# Patient Record
Sex: MALE | Race: OTHER | HISPANIC OR LATINO | URBAN - METROPOLITAN AREA
[De-identification: names, ages, dates, MRNs, and addresses within clinical notes are randomized per-mention and may not be internally consistent; named-entity substitution may affect disease eponyms.]

---

## 2020-12-21 ENCOUNTER — EMERGENCY (EMERGENCY)
Facility: HOSPITAL | Age: 25
LOS: 1 days | Discharge: HOME | End: 2020-12-21
Attending: STUDENT IN AN ORGANIZED HEALTH CARE EDUCATION/TRAINING PROGRAM | Admitting: STUDENT IN AN ORGANIZED HEALTH CARE EDUCATION/TRAINING PROGRAM
Payer: OTHER MISCELLANEOUS

## 2020-12-21 VITALS
SYSTOLIC BLOOD PRESSURE: 139 MMHG | OXYGEN SATURATION: 100 % | RESPIRATION RATE: 18 BRPM | HEART RATE: 81 BPM | DIASTOLIC BLOOD PRESSURE: 80 MMHG | TEMPERATURE: 99 F

## 2020-12-21 VITALS
RESPIRATION RATE: 20 BRPM | OXYGEN SATURATION: 100 % | TEMPERATURE: 99 F | SYSTOLIC BLOOD PRESSURE: 141 MMHG | DIASTOLIC BLOOD PRESSURE: 89 MMHG | HEART RATE: 82 BPM

## 2020-12-21 LAB
ALBUMIN SERPL ELPH-MCNC: 4.6 G/DL — SIGNIFICANT CHANGE UP (ref 3.3–5)
ALP SERPL-CCNC: 75 U/L — SIGNIFICANT CHANGE UP (ref 40–120)
ALT FLD-CCNC: 111 U/L — HIGH (ref 4–41)
ANION GAP SERPL CALC-SCNC: 9 MMOL/L — SIGNIFICANT CHANGE UP (ref 7–14)
APTT BLD: 30.6 SEC — SIGNIFICANT CHANGE UP (ref 27–36.3)
AST SERPL-CCNC: 40 U/L — SIGNIFICANT CHANGE UP (ref 4–40)
BASOPHILS # BLD AUTO: 0.03 K/UL — SIGNIFICANT CHANGE UP (ref 0–0.2)
BASOPHILS NFR BLD AUTO: 0.4 % — SIGNIFICANT CHANGE UP (ref 0–2)
BILIRUB SERPL-MCNC: 0.4 MG/DL — SIGNIFICANT CHANGE UP (ref 0.2–1.2)
BUN SERPL-MCNC: 14 MG/DL — SIGNIFICANT CHANGE UP (ref 7–23)
CALCIUM SERPL-MCNC: 10.3 MG/DL — SIGNIFICANT CHANGE UP (ref 8.4–10.5)
CHLORIDE SERPL-SCNC: 99 MMOL/L — SIGNIFICANT CHANGE UP (ref 98–107)
CO2 SERPL-SCNC: 31 MMOL/L — SIGNIFICANT CHANGE UP (ref 22–31)
CREAT SERPL-MCNC: 0.86 MG/DL — SIGNIFICANT CHANGE UP (ref 0.5–1.3)
EOSINOPHIL # BLD AUTO: 0.12 K/UL — SIGNIFICANT CHANGE UP (ref 0–0.5)
EOSINOPHIL NFR BLD AUTO: 1.7 % — SIGNIFICANT CHANGE UP (ref 0–6)
GLUCOSE SERPL-MCNC: 92 MG/DL — SIGNIFICANT CHANGE UP (ref 70–99)
HCT VFR BLD CALC: 47.4 % — SIGNIFICANT CHANGE UP (ref 39–50)
HGB BLD-MCNC: 15.3 G/DL — SIGNIFICANT CHANGE UP (ref 13–17)
IANC: 4.78 K/UL — SIGNIFICANT CHANGE UP (ref 1.5–8.5)
IMM GRANULOCYTES NFR BLD AUTO: 0.3 % — SIGNIFICANT CHANGE UP (ref 0–1.5)
INR BLD: 1.15 RATIO — SIGNIFICANT CHANGE UP (ref 0.88–1.17)
LYMPHOCYTES # BLD AUTO: 1.61 K/UL — SIGNIFICANT CHANGE UP (ref 1–3.3)
LYMPHOCYTES # BLD AUTO: 22.5 % — SIGNIFICANT CHANGE UP (ref 13–44)
MAGNESIUM SERPL-MCNC: 1.9 MG/DL — SIGNIFICANT CHANGE UP (ref 1.6–2.6)
MCHC RBC-ENTMCNC: 29.1 PG — SIGNIFICANT CHANGE UP (ref 27–34)
MCHC RBC-ENTMCNC: 32.3 GM/DL — SIGNIFICANT CHANGE UP (ref 32–36)
MCV RBC AUTO: 90.1 FL — SIGNIFICANT CHANGE UP (ref 80–100)
MONOCYTES # BLD AUTO: 0.61 K/UL — SIGNIFICANT CHANGE UP (ref 0–0.9)
MONOCYTES NFR BLD AUTO: 8.5 % — SIGNIFICANT CHANGE UP (ref 2–14)
NEUTROPHILS # BLD AUTO: 4.78 K/UL — SIGNIFICANT CHANGE UP (ref 1.8–7.4)
NEUTROPHILS NFR BLD AUTO: 66.6 % — SIGNIFICANT CHANGE UP (ref 43–77)
NRBC # BLD: 0 /100 WBCS — SIGNIFICANT CHANGE UP
NRBC # FLD: 0 K/UL — SIGNIFICANT CHANGE UP
PLATELET # BLD AUTO: 277 K/UL — SIGNIFICANT CHANGE UP (ref 150–400)
POTASSIUM SERPL-MCNC: 3.7 MMOL/L — SIGNIFICANT CHANGE UP (ref 3.5–5.3)
POTASSIUM SERPL-SCNC: 3.7 MMOL/L — SIGNIFICANT CHANGE UP (ref 3.5–5.3)
PROT SERPL-MCNC: 7.4 G/DL — SIGNIFICANT CHANGE UP (ref 6–8.3)
PROTHROM AB SERPL-ACNC: 13.1 SEC — SIGNIFICANT CHANGE UP (ref 9.8–13.1)
RBC # BLD: 5.26 M/UL — SIGNIFICANT CHANGE UP (ref 4.2–5.8)
RBC # FLD: 12 % — SIGNIFICANT CHANGE UP (ref 10.3–14.5)
SARS-COV-2 RNA SPEC QL NAA+PROBE: SIGNIFICANT CHANGE UP
SODIUM SERPL-SCNC: 139 MMOL/L — SIGNIFICANT CHANGE UP (ref 135–145)
WBC # BLD: 7.17 K/UL — SIGNIFICANT CHANGE UP (ref 3.8–10.5)
WBC # FLD AUTO: 7.17 K/UL — SIGNIFICANT CHANGE UP (ref 3.8–10.5)

## 2020-12-21 PROCEDURE — 99283 EMERGENCY DEPT VISIT LOW MDM: CPT

## 2020-12-21 RX ORDER — FONDAPARINUX SODIUM 2.5 MG/.5ML
1 INJECTION, SOLUTION SUBCUTANEOUS
Qty: 42 | Refills: 0
Start: 2020-12-21 | End: 2021-01-10

## 2020-12-21 RX ORDER — RIVAROXABAN 15 MG-20MG
15 KIT ORAL ONCE
Refills: 0 | Status: COMPLETED | OUTPATIENT
Start: 2020-12-21 | End: 2020-12-21

## 2020-12-21 RX ADMIN — RIVAROXABAN 15 MILLIGRAM(S): KIT at 19:33

## 2020-12-21 NOTE — ED PROVIDER NOTE - OBJECTIVE STATEMENT
25M s/p ACL surgery 2 weeks ago and c/o confirmed DVT in the right leg diagnosed on ultrasound today. no pcp.  no cp or sob

## 2020-12-21 NOTE — ED ADULT NURSE NOTE - NSIMPLEMENTINTERV_GEN_ALL_ED
Implemented All Universal Safety Interventions:  [place] to call system. Call bell, personal items and telephone within reach. Instruct patient to call for assistance. Room bathroom lighting operational. Non-slip footwear when patient is off stretcher. Physically safe environment: no spills, clutter or unnecessary equipment. Stretcher in lowest position, wheels locked, appropriate side rails in place.

## 2020-12-21 NOTE — ED PROVIDER NOTE - ATTENDING CONTRIBUTION TO CARE
26 y/o M with no PMH p/w Right calf swelling. pt recently had ACL surgery 2 weeks ago with dr. salvador sheikh. he was having PT and the noticed swelling in his right lower foot. he had an US showing acute venous thrombosis of the distal posterior tibital vein. Pt denies chest pain, SOB nausea vomiting, diarrhea. he states he has mild pain in the right calf with swelling.   he denies having any allergies or PCP.   denies fever, chills, chest pain, SOB, abdominal pain, diarrhea, dysuria, syncope, bleeding, new rash, weakness, numbness, blurred vision  right leg swelling  ROS  otherwise negative as per HPI  Gen: Awake, Alert, WD, WN, NAD  Head:  NC/AT  Eyes:  PERRL, EOMI, Conjunctiva pink, lids normal, no scleral icterus  ENT: OP clear, no exudates, no erythema, uvula midline, TMs clear bilaterally, moist mucus membranes  Neck: supple, nontender, no meningismus, no JVD, trachea midline  Cardiac/CV:  S1 S2, RRR, no M/G/R  Respiratory/Pulm:  CTAB, good air movement, normal resp effort, no wheezes/stridor/retractions/rales/rhonchi  Gastrointestinal/Abdomen:  Soft, nontender, nondistended, +BS, no rebound/guarding  Back:  no CVAT, no MLT  Ext:  warm, well perfused, moving all extremities spontaneously, no peripheral edema, distal pulses intact. swelling of right lower extremity   Skin: intact, no rash, incision sites c/d/i   Neuro:  AAOx3, sensation intact, motor 5/5 x 4 extremities, normal gait, speech clear  MDM as above

## 2020-12-21 NOTE — ED PROVIDER NOTE - PHYSICAL EXAMINATION
GEN: Well appearing, well nourished, in no apparent distress.  HEAD: NCAT  HEENT: PERRL, Airway patent, EOMI, non-erythematous pharynx, no exudates, uvula midline, MMM, neck supple, no LAD, no JVD  LUNG: CTAB, no adventitious sounds, no retractions, no nasal flaring  CV: RRR, no murmurs,   Abd: soft, NTND, no rebound or guarding, BS+ in all quadrants, no CVAT  MSK: WWP, Pulses 2+ in extremities, No edema +mild r knee redness +ttp posterior leg   Neuro:  AAOx3, Ambulatory with stable gait. SCHWARTZ without laterality  Skin: Warm and dry, no evidence of rash  Psych: normal mood and affect

## 2020-12-21 NOTE — ED PROVIDER NOTE - NSFOLLOWUPCLINICS_GEN_ALL_ED_FT
[facility] General Internal Medicine  General Internal Medicine  [address] [id number]  Phone: [phone]  Fax:   Follow Up Time:

## 2020-12-21 NOTE — ED ADULT NURSE NOTE - OBJECTIVE STATEMENT
pt alert,oriented x3. redness r leg with swelling. sent to ed with positive dvt for evaluaution. iv access,labs sent. will continue to monitor

## 2020-12-21 NOTE — ED PROVIDER NOTE - NS ED ROS FT
Constitutional: no fevers, no chills.  Eyes: no visual changes.  Ears: no ear drainage, no ear pain.  Nose: no nasal congestion.  Mouth/Throat: no sore throat.  Cardiovascular: no chest pain.  Respiratory: no shortness of breath, no wheezing, no cough  Gastrointestinal: no nausea, no vomiting, no diarrhea, no abdominal pain.  MSK: no flank pain, no back pain. +R leg pain  Genitourinary: no dysuria, no hematuria.  Skin: no rashes.  Neuro: no headache

## 2020-12-21 NOTE — ED PROVIDER NOTE - NSFOLLOWUPINSTRUCTIONS_ED_ALL_ED_FT
1) Please Follow up with PMD/Clinic within 2-3 days!!! Call the number above to make appointment.     2) Please start taking Xarelto starting tomorrow morning for next 21 days.     3) Please take Tylenol 650mg every 4-6 hours as needed for pain.    4) Please return to  Emergency Department for any new or worsening symptoms.

## 2020-12-21 NOTE — ED PROVIDER NOTE - PATIENT PORTAL LINK FT
You can access the FollowMyHealth Patient Portal offered by [facility] by registering at the following website: [email or web address]. By joining [facility]’s FollowMyHealth portal, you will also be able to view your health information using other applications (apps) compatible with our system.

## 2020-12-21 NOTE — ED ADULT TRIAGE NOTE - CHIEF COMPLAINT QUOTE
Pt s/p ACL surgery 2 weeks ago and c/o confirmed DVT in the right leg diagnosed on ultrasound today. Pt denies SOB

## 2020-12-24 PROBLEM — Z00.00 ENCOUNTER FOR PREVENTIVE HEALTH EXAMINATION: Status: ACTIVE | Noted: 2020-12-24

## 2021-01-08 ENCOUNTER — OUTPATIENT (OUTPATIENT)
Dept: OUTPATIENT SERVICES | Facility: HOSPITAL | Age: 26
LOS: 1 days | End: 2021-01-08
Payer: SELF-PAY

## 2021-01-08 ENCOUNTER — APPOINTMENT (OUTPATIENT)
Dept: INTERNAL MEDICINE | Facility: CLINIC | Age: 26
End: 2021-01-08

## 2021-01-08 DIAGNOSIS — I10 ESSENTIAL (PRIMARY) HYPERTENSION: ICD-10-CM

## 2021-01-08 DIAGNOSIS — Z78.9 OTHER SPECIFIED HEALTH STATUS: ICD-10-CM

## 2021-01-08 PROCEDURE — G0463: CPT

## 2021-01-08 RX ORDER — RIVAROXABAN 15 MG/1
15 TABLET, FILM COATED ORAL DAILY
Refills: 0 | Status: ACTIVE | COMMUNITY
Start: 2021-01-08

## 2021-01-08 RX ORDER — RIVAROXABAN 20 MG/1
20 TABLET, FILM COATED ORAL DAILY
Qty: 90 | Refills: 0 | Status: ACTIVE | COMMUNITY
Start: 2021-01-08 | End: 1900-01-01

## 2021-01-08 NOTE — PHYSICAL EXAM
[No Acute Distress] : no acute distress [Well Nourished] : well nourished [No Respiratory Distress] : no respiratory distress  [No Accessory Muscle Use] : no accessory muscle use [de-identified] : appears comfortable [de-identified] : N [de-identified] : R knee with mild swelling, no erythema present. R and L calf region equal in girth. No discoloration
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
## 2021-01-08 NOTE — ASSESSMENT
[FreeTextEntry1] : \par Patient is a 25 year old otherwise healthy male with recent history of provoked R DVT after R knee surgery on xarelto\par \par #Provoked DVT on US 12/21\par -  xarelto 15mg BID was prescribed in ER x 21 days\par - will start xarelto 20mg q D for at least 3 months afterwards\par - advised on redflag symps for PE\par - will f/u in 1 month\par \par d/w Dr. Jay \par

## 2021-01-15 DIAGNOSIS — I82.409 ACUTE EMBOLISM AND THROMBOSIS OF UNSPECIFIED DEEP VEINS OF UNSPECIFIED LOWER EXTREMITY: ICD-10-CM

## 2021-01-15 DIAGNOSIS — Z78.9 OTHER SPECIFIED HEALTH STATUS: ICD-10-CM

## 2021-01-28 ENCOUNTER — APPOINTMENT (OUTPATIENT)
Dept: INTERNAL MEDICINE | Facility: CLINIC | Age: 26
End: 2021-01-28

## 2021-01-28 ENCOUNTER — NON-APPOINTMENT (OUTPATIENT)
Age: 26
End: 2021-01-28

## 2021-01-28 VITALS
HEART RATE: 68 BPM | WEIGHT: 192 LBS | OXYGEN SATURATION: 98 % | SYSTOLIC BLOOD PRESSURE: 120 MMHG | BODY MASS INDEX: 29.1 KG/M2 | HEIGHT: 68 IN | DIASTOLIC BLOOD PRESSURE: 70 MMHG

## 2021-01-28 DIAGNOSIS — I82.409 ACUTE EMBOLISM AND THROMBOSIS OF UNSPECIFIED DEEP VEINS OF UNSPECIFIED LOWER EXTREMITY: ICD-10-CM

## 2021-01-31 NOTE — ASSESSMENT
[FreeTextEntry1] : 26 y/o gentleman w/PMH of R posterior tibial vein DVT (provoked), on Xarelto for AC, presents for letter of clearance to begin PT\par \par #DVT\par -letter to be written today, pt is clear from medical perspective to begin PT as he is tolerating AC treatment w/no new bleeding/bruising.\par -will see pt in April, when he finishes 3 month course of AC for provoked DVT

## 2021-01-31 NOTE — HISTORY OF PRESENT ILLNESS
[FreeTextEntry8] : Pt here today requesting a letter stating he is medically cleared to start PT. Has been taking Xarelto 20mg since 1/12/21 w/no issues.

## 2021-05-11 ENCOUNTER — APPOINTMENT (OUTPATIENT)
Dept: INTERNAL MEDICINE | Facility: CLINIC | Age: 26
End: 2021-05-11

## 2022-12-01 ENCOUNTER — EMERGENCY (EMERGENCY)
Facility: HOSPITAL | Age: 27
LOS: 1 days | Discharge: HOME | End: 2022-12-01
Attending: EMERGENCY MEDICINE | Admitting: EMERGENCY MEDICINE

## 2022-12-01 VITALS
TEMPERATURE: 101 F | SYSTOLIC BLOOD PRESSURE: 152 MMHG | RESPIRATION RATE: 16 BRPM | DIASTOLIC BLOOD PRESSURE: 72 MMHG | OXYGEN SATURATION: 98 % | HEART RATE: 100 BPM

## 2022-12-01 VITALS
HEART RATE: 118 BPM | SYSTOLIC BLOOD PRESSURE: 139 MMHG | OXYGEN SATURATION: 99 % | RESPIRATION RATE: 16 BRPM | DIASTOLIC BLOOD PRESSURE: 77 MMHG | TEMPERATURE: 103 F

## 2022-12-01 LAB
FLUAV AG NPH QL: DETECTED
FLUBV AG NPH QL: SIGNIFICANT CHANGE UP
RSV RNA NPH QL NAA+NON-PROBE: SIGNIFICANT CHANGE UP
SARS-COV-2 RNA SPEC QL NAA+PROBE: SIGNIFICANT CHANGE UP

## 2022-12-01 PROCEDURE — 99284 EMERGENCY DEPT VISIT MOD MDM: CPT

## 2022-12-01 RX ORDER — ACETAMINOPHEN 500 MG
975 TABLET ORAL ONCE
Refills: 0 | Status: COMPLETED | OUTPATIENT
Start: 2022-12-01 | End: 2022-12-01

## 2022-12-01 RX ORDER — IBUPROFEN 200 MG
600 TABLET ORAL ONCE
Refills: 0 | Status: COMPLETED | OUTPATIENT
Start: 2022-12-01 | End: 2022-12-01

## 2022-12-01 RX ADMIN — Medication 975 MILLIGRAM(S): at 11:18

## 2022-12-01 RX ADMIN — Medication 600 MILLIGRAM(S): at 11:18

## 2022-12-01 NOTE — ED PROVIDER NOTE - NS ED MD DISPO DISCHARGE CCDA
Past History


Past Medical History:  Depression, HIV


Past Surgical History:  Other


Alcohol Use:  Occasionally


Additional Alcohol Information:  


4 beers/weeks


Drug Use:  Cocaine, Methamphetamine





Adult General


Chief Complaint


Chief Complaint:  multiple complaining





HPI


HPI





Patient is a 57 year old male patient with history of HIV brought in by EMS 

because of multiple complaints including abdominal pain and fever and head 

injury. Patient states he hit his head accidentally 5 days ago and seen at [facility] with unremarkable CT of his head still has headache. Patient also 

complaining of hurting all over and having subjective fever for the last 4 days 

without cough, congestion, sore throat, vomiting and diarrhea, urinary symptom, 

sick contact. Patient states he has left inguinal hernia for 2 years and 

recently he gets more pain after eating with bulging in left inguinal area that 

resolves after supine position. He states he has plan to have surgery at [facility] but he is concern for problem in his inguinal area and wants 

ultrasound. Patient was admitted to use illegal drugs and states he had 

methamphetamine 1 week ago and cocaine yesterday. Patient states he has had 

history of HIV since  and for the last 2 weeks did not take any medication.





Review of Systems


Review of Systems





Constitutional: Reports fever and chills


Eyes: Denies change in visual acuity, redness, or eye pain []


HENT: Denies nasal congestion or sore throat []


Respiratory: Denies cough or shortness of breath []


Cardiovascular: No additional information not addressed in HPI []


GI: Reports abdominal pain, denies nausea, vomiting, bloody stools or diarrhea [

]


: Denies dysuria or hematuria []


Musculoskeletal: Denies back pain or joint pain []


Integument: Denies rash or skin lesions []


Neurologic: Reports headache, denies focal weakness or sensory changes []


Endocrine: Denies polyuria or polydipsia []





All other systems were reviewed and found to be within normal limits, except as 

documented in this note.





Current Medications


Current Medications





Current Medications








 Medications


  (Trade)  Dose


 Ordered  Sig/Clemente  Start Time


 Stop Time Status Last Admin


Dose Admin


 


 Ceftriaxone


 Sodium 1 gm/


 Sodium Chloride  50 ml @ 


 100 mls/hr  1X  ONCE  19 14:00


 19 14:29 UNV  





 


 Ibuprofen


  (Motrin)  800 mg  1X  ONCE  19 13:30


 19 13:31 DC 19 13:15


800 MG


 


 Sodium Chloride  1,000 ml @ 


 1,000 mls/hr  1X  ONCE  19 14:00


 19 14:59 UNV  














Allergies


Allergies





Allergies








Coded Allergies Type Severity Reaction Last Updated Verified


 


  No Known Drug Allergies    19 No











Physical Exam


Physical Exam





Constitutional: Well  nourished, mild distress, non-toxic appearance, 

temperature of 102.9. []


HENT: Normocephalic, atraumatic, bilateral external ears normal, oropharynx dry

, no oral exudates, nose normal. []


Eyes: PERRLA, EOMI, conjunctiva normal, no discharge. [] 


Neck: Normal range of motion, no tenderness, supple, no stridor. [] 


Cardiovascular: Tachycardia, no murmur []


Lungs & Thorax:  Bilateral breath sounds clear to auscultation []


Abdomen: Bowel sounds normal, soft, no tenderness, no masses, no pulsatile 

masses left inguinal hernia without incarceration or strangulation or palpable 

mass.] 


Skin: Warm, dry, no erythema, no rash. [] 


Back: No tenderness, no CVA tenderness. [] 


Extremities: No tenderness, no cyanosis, no clubbing, ROM intact, no edema. [] 


Neurologic: Alert and oriented X 3, normal motor function, normal sensory 

function, no focal deficits noted. []


Psychologic: Affect anxious, mood normal. []





Current Patient Data


Vital Signs





 Vital Signs








  Date Time  Temp Pulse Resp B/P (MAP) Pulse Ox O2 Delivery O2 Flow Rate FiO2


 


19 12:33 102.9 92 20  99 Room Air  








Lab Results





 Laboratory Tests








Test


 19


13:00 19


13:15


 


White Blood Count


 11.8 x10^3/uL


(4.0-11.0)  H 





 


Red Blood Count


 4.43 x10^6/uL


(4.30-5.70) 





 


Hemoglobin


 12.4 g/dL


(13.0-17.5)  L 





 


Hematocrit


 38.1 %


(39.0-53.0)  L 





 


Mean Corpuscular Volume


 86 fL ()


 





 


Mean Corpuscular Hemoglobin 28 pg (25-35)   


 


Mean Corpuscular Hemoglobin


Concent 33 g/dL


(31-37) 





 


Red Cell Distribution Width


 14.4 %


(11.5-14.5) 





 


Platelet Count


 248 x10^3/uL


(140-400) 





 


Neutrophils (%) (Auto) 81 % (31-73)  H 


 


Lymphocytes (%) (Auto) 10 % (24-48)  L 


 


Monocytes (%) (Auto) 8 % (0-9)   


 


Eosinophils (%) (Auto) 0 % (0-3)   


 


Basophils (%) (Auto) 0 % (0-3)   


 


Neutrophils # (Auto)


 9.6 x10^3uL


(1.8-7.7)  H 





 


Lymphocytes # (Auto)


 1.2 x10^3/uL


(1.0-4.8) 





 


Monocytes # (Auto)


 0.9 x10^3/uL


(0.0-1.1) 





 


Eosinophils # (Auto)


 0.1 x10^3/uL


(0.0-0.7) 





 


Basophils # (Auto)


 0.0 x10^3/uL


(0.0-0.2) 





 


Sodium Level


 138 mmol/L


(136-145) 





 


Potassium Level


 4.5 mmol/L


(3.5-5.1) 





 


Chloride Level


 101 mmol/L


() 





 


Carbon Dioxide Level


 29 mmol/L


(21-32) 





 


Anion Gap 8 (6-14)   


 


Blood Urea Nitrogen


 11 mg/dL


(8-26) 





 


Creatinine


 1.2 mg/dL


(0.7-1.3) 





 


Estimated GFR


(Cockcroft-Gault) 62.2  


 





 


BUN/Creatinine Ratio 9 (6-20)   


 


Glucose Level


 123 mg/dL


(70-99)  H 





 


Lactic Acid Level


 1.1 mmol/L


(0.4-2.0) 





 


Calcium Level


 8.0 mg/dL


(8.5-10.1)  L 





 


Total Bilirubin


 0.2 mg/dL


(0.2-1.0) 





 


Aspartate Amino Transferase


(AST) 15 U/L (15-37)


 





 


Alanine Aminotransferase (ALT)


 20 U/L (16-63)


 





 


Alkaline Phosphatase


 89 U/L


() 





 


Total Protein


 7.0 g/dL


(6.4-8.2) 





 


Albumin


 2.8 g/dL


(3.4-5.0)  L 





 


Albumin/Globulin Ratio


 0.7 (1.0-1.7)


L 





 


Heterophil Agglutinins


 Negative


(NEGATIVE) 





 


Influenza Type A (Rapid)


 


 Negative


(NEGATIVE)


 


Influenza Type B (Rapid)


 


 Negative


(NEGATIVE)











EKG


EKG


[]





Radiology/Procedures


Radiology/Procedures


 [facility] [address], [place] [address]


 [phone]


 


 IMAGING REPORT





 Signed





PATIENT: SHILO HOUSTON ACCOUNT: [id number] MRN#: [id number]


: 1961 LOCATION: ER AGE: 57


SEX: M EXAM DT: 19 ACCESSION#: [id number]


STATUS: REG ER ORD. PHYSICIAN: BELINDA MONTGOMERY MD 


REASON: fever


PROCEDURE: CHEST PA & LATERAL





EXAM: Chest, 2 views.


 


HISTORY: Cough and fever.


 


COMPARISON: None.


 


FINDINGS: 2 views of the chest are obtained. There is no infiltrate, 


pleural effusion or pneumothorax. The heart is normal in size. There are 


small nodular opacity overlying the left lower thorax, possibly due to 


overlying osseous and pulmonary vessels shadows.


 


IMPRESSION: 


1. No acute pulmonary finding.


2. Small nodular opacity overlying the left lower thorax, possibly 


artifactual. Short-term radiographic follow-up is recommended to exclude a


nodule in this location.


 


Electronically signed by: Tiarra Saleh MD (2019 1:30 PM) [address] [id number]














DICTATED AND SIGNED BY:     TIARRA SALEH MD


DATE:     19 [id number]





CC: BELINDA MONTGOMERY MD; NON,STAFF ~





Course & Med Decision Making


Course & Med Decision Making


Pertinent Labs and Imaging studies reviewed. (See chart for details)





Evaluation of patient in ER showed 57-year-old male patient with history of HIV 

with fever of 102.9 without source of infection. Patient treated with IV fluid 

and antibiotic in ER and felt better. Patient is a patient of [facility] and 

[facility]  [facility] transfer to [facility] ER at 

1540.





Dragon Disclaimer


Dragon Disclaimer


This electronic medical record was generated, in whole or in part, using a 

voice recognition dictation system.





Departure


Departure:


Impression:  


 Primary Impression:  


 Fever of unknown origin


 Additional Impressions:  


 HIV (human immunodeficiency virus infection)


 Inguinal hernia


 Cocaine abuse


 Tobacco abuse counseling


 Tobacco abuse


 Anxiety


Disposition:   XFER OTHER ([facility] 1540)


Condition:  GUARDED


Referrals:  


NON,STAFF (PCP)





Critical Care Time


 Critical care time was 70  minutes exclusive of procedures.





Problem Qualifiers











BELINDA MONTGOMERY MD 2019 13:54 Patient/Caregiver provided printed discharge information.

## 2022-12-01 NOTE — ED PROVIDER NOTE - OBJECTIVE STATEMENT
27 M with no PMH presenting for fever, sore throat, cough, nasal congestion x 3 days. No ill contacts. Took tylenol last night with mild relief of sx. Also took an unknown antibiotic that he had from a previous illness, but does not recall the name. Denies HA, abdominal pain, N/V/D, rash. Has been able to eat and drink.

## 2022-12-01 NOTE — ED ADULT NURSE NOTE - OBJECTIVE STATEMENT
Pt presents to ED ambulatory with steady gait c/o flu like symptoms including sore throat, HA, cough, and fevers x5 days. Reports taking tylenol and antibiotics that he found at home. Denies any other medical complaints.

## 2022-12-01 NOTE — ED PROVIDER NOTE - CLINICAL SUMMARY MEDICAL DECISION MAKING FREE TEXT BOX
28 M no PMH with fever, sore throat, nasal congestion, cough x 3 days. No ill contacts. Febrile to 103.1, tachy to 118. tachy to 103 when being examined. tachycardia likely 2/2 fever. No tylenol yet today. Will give tylenol and motrin for fever. Likely viral etiology. Will send covid flu swab and reassess.

## 2022-12-01 NOTE — ED PROVIDER NOTE - NSFOLLOWUPINSTRUCTIONS_ED_ALL_ED_FT
No signs of emergency medical condition on today's workup.  Presumptive diagnosis made, but further evaluation may be required by your primary care doctor or specialist for a definitive diagnosis.  Therefore, follow up as directed and if symptoms change/worsen or any emergency conditions, please return to the ER.    You likely have a viral infection. Please take tylenol 625mg every 4 hours and ibuprofen 600mg every 6 hours as needed for pain and fever. If symptoms worsen of fever persists please return to ED. Follow up with primary care doctor in 1 week. thank you.     A viral respiratory infection is an illness that affects parts of the body that are used for breathing. These include the lungs, nose, and throat. It is caused by a germ called a virus.    Some examples of this kind of infection are:  •A cold.      •The flu (influenza).      •A respiratory syncytial virus (RSV) infection.        What are the causes?    This condition is caused by a virus. It spreads from person to person. You can get the virus if:  •You breathe in droplets from someone who is sick.      •You come in contact with people who are sick.      •You touch mucus or other fluid from a person who is sick.        What are the signs or symptoms?    Symptoms of this condition include:  •A stuffy or runny nose.      •A sore throat.      •A cough.      •Shortness of breath.      •Trouble breathing.      •Yellow or green fluid in the nose.      Other symptoms may include:  •A fever.      •Sweating or chills.      •Tiredness (fatigue).      •Achy muscles.      •A headache.        How is this treated?    This condition may be treated with:  •Medicines that treat viruses.      •Medicines that make it easy to breathe.      •Medicines that are sprayed into the nose.      •Acetaminophen or NSAIDs, such as ibuprofen, to treat fever.        Follow these instructions at home:    Managing pain and congestion     •Take over-the-counter and prescription medicines only as told by your doctor.    •If you have a sore throat, gargle with salt water. Do this 3–4 times a day or as needed.  •To make salt water, dissolve ½–1 tsp (3–6 g) of salt in 1 cup (237 mL) of warm water. Make sure that all the salt dissolves.        •Use nose drops made from salt water. This helps with stuffiness (congestion). It also helps soften the skin around your nose.    •Take 2 tsp (10 mL) of honey at bedtime to lessen coughing at night.  •Do not give honey to children who are younger than 1 year old.        •Drink enough fluid to keep your pee (urine) pale yellow.        General instructions   A sign telling the reader not to smoke.   •Rest as much as possible.      • Do not drink alcohol.      • Do not smoke or use any products that contain nicotine or tobacco. If you need help quitting, ask your doctor.      •Keep all follow-up visits.        How is this prevented?       Washing hands with soap and water.       A person covering her mouth and nose with a cloth while sneezing.     •Get a flu shot every year. Ask your doctor when you should get your flu shot.    • Do not let other people get your germs. If you are sick:  •Wash your hands with soap and water often. Wash your hands after you cough or sneeze. Wash hands for at least 20 seconds. If you cannot use soap and water, use hand .      •Cover your mouth when you cough. Cover your nose and mouth when you sneeze.      •Do not share cups or eating utensils.      •Clean commonly used objects often. Clean commonly touched surfaces.      •Stay home from work or school.        •Avoid contact with people who are sick during cold and flu season. This is in fall and winter.        Get help if:    •Your symptoms last for 10 days or longer.      •Your symptoms get worse over time.      •You have very bad pain in your face or forehead.      •Parts of your jaw or neck get very swollen.      •You have shortness of breath.        Get help right away if:    •You feel pain or pressure in your chest.      •You have trouble breathing.      •You faint or feel like you will faint.      •You keep vomiting and it gets worse.      •You feel confused.      These symptoms may be an emergency. Get help right away. Call your local emergency services (911 in the [place]   • Do not wait to see if the symptoms will go away.        •  Do not drive yourself to the hospital.         Summary    •A viral respiratory infection is an illness that affects parts of the body that are used for breathing.      •Examples of this illness include a cold, the flu, and a respiratory syncytial virus (RSV) infection.      •The infection can cause a runny nose, cough, sore throat, and fever.      •Follow what your doctor tells you about taking medicines, drinking lots of fluid, washing your hands, resting at home, and avoiding people who are sick. No signs of emergency medical condition on today's workup.  Presumptive diagnosis made, but further evaluation may be required by your primary care doctor or specialist for a definitive diagnosis.  Therefore, follow up as directed and if symptoms change/worsen or any emergency conditions, please return to the ER.    You likely have a viral infection. Please take tylenol 325mg every 4 hours and ibuprofen 200 mg every 6 hours as needed for pain and fever. If symptoms worsen of fever persists please return to ED. Follow up with primary care doctor in 1 week. thank you.     A viral respiratory infection is an illness that affects parts of the body that are used for breathing. These include the lungs, nose, and throat. It is caused by a germ called a virus.    Some examples of this kind of infection are:  •A cold.      •The flu (influenza).      •A respiratory syncytial virus (RSV) infection.        What are the causes?    This condition is caused by a virus. It spreads from person to person. You can get the virus if:  •You breathe in droplets from someone who is sick.      •You come in contact with people who are sick.      •You touch mucus or other fluid from a person who is sick.        What are the signs or symptoms?    Symptoms of this condition include:  •A stuffy or runny nose.      •A sore throat.      •A cough.      •Shortness of breath.      •Trouble breathing.      •Yellow or green fluid in the nose.      Other symptoms may include:  •A fever.      •Sweating or chills.      •Tiredness (fatigue).      •Achy muscles.      •A headache.        How is this treated?    This condition may be treated with:  •Medicines that treat viruses.      •Medicines that make it easy to breathe.      •Medicines that are sprayed into the nose.      •Acetaminophen or NSAIDs, such as ibuprofen, to treat fever.        Follow these instructions at home:    Managing pain and congestion     •Take over-the-counter and prescription medicines only as told by your doctor.    •If you have a sore throat, gargle with salt water. Do this 3–4 times a day or as needed.  •To make salt water, dissolve ½–1 tsp (3–6 g) of salt in 1 cup (237 mL) of warm water. Make sure that all the salt dissolves.        •Use nose drops made from salt water. This helps with stuffiness (congestion). It also helps soften the skin around your nose.    •Take 2 tsp (10 mL) of honey at bedtime to lessen coughing at night.  •Do not give honey to children who are younger than 1 year old.        •Drink enough fluid to keep your pee (urine) pale yellow.        General instructions   A sign telling the reader not to smoke.   •Rest as much as possible.      • Do not drink alcohol.      • Do not smoke or use any products that contain nicotine or tobacco. If you need help quitting, ask your doctor.      •Keep all follow-up visits.        How is this prevented?       Washing hands with soap and water.       A person covering her mouth and nose with a cloth while sneezing.     •Get a flu shot every year. Ask your doctor when you should get your flu shot.    • Do not let other people get your germs. If you are sick:  •Wash your hands with soap and water often. Wash your hands after you cough or sneeze. Wash hands for at least 20 seconds. If you cannot use soap and water, use hand .      •Cover your mouth when you cough. Cover your nose and mouth when you sneeze.      •Do not share cups or eating utensils.      •Clean commonly used objects often. Clean commonly touched surfaces.      •Stay home from work or school.        •Avoid contact with people who are sick during cold and flu season. This is in fall and winter.        Get help if:    •Your symptoms last for 10 days or longer.      •Your symptoms get worse over time.      •You have very bad pain in your face or forehead.      •Parts of your jaw or neck get very swollen.      •You have shortness of breath.        Get help right away if:    •You feel pain or pressure in your chest.      •You have trouble breathing.      •You faint or feel like you will faint.      •You keep vomiting and it gets worse.      •You feel confused.      These symptoms may be an emergency. Get help right away. Call your local emergency services (911 in the [place]   • Do not wait to see if the symptoms will go away.        •  Do not drive yourself to the hospital.         Summary    •A viral respiratory infection is an illness that affects parts of the body that are used for breathing.      •Examples of this illness include a cold, the flu, and a respiratory syncytial virus (RSV) infection.      •The infection can cause a runny nose, cough, sore throat, and fever.      •Follow what your doctor tells you about taking medicines, drinking lots of fluid, washing your hands, resting at home, and avoiding people who are sick.

## 2022-12-01 NOTE — ED PROVIDER NOTE - PHYSICAL EXAMINATION
Gen:  NAD  HEENT: +tonsillar erythema without exudate and uvula midline. EOMI, no nasal discharge, mucous membranes moist  CV: tachycardic, +S1/S2, no M/R/G, 2+ radial pulses b/l  Resp: CTAB, no W/R/R, no accessory muscle use, no increased work of breathing  GI: Abdomen soft non-distended, NTTP  MSK: No open wounds, no bruising, no LE edema  Neuro: A&Ox4, following commands, moving all four extremities spontaneously  Psych: appropriate mood Gen:  NAD  HEENT: no exudate and uvula midline. EOMI, no nasal discharge, mucous membranes moist  CV: tachycardic, +S1/S2, no M/R/G, 2+ radial pulses b/l  Resp: CTAB, no W/R/R, no accessory muscle use, no increased work of breathing  GI: Abdomen soft non-distended, NTTP  MSK: No open wounds, no bruising, no LE edema  Neuro: A&Ox4, following commands, moving all four extremities spontaneously  Psych: appropriate mood

## 2022-12-01 NOTE — ED PROVIDER NOTE - ATTENDING CONTRIBUTION TO CARE
I performed a face to face evaluation of this patient and performed a full history and physical examination on the patient.  I agree with the resident's history, physical examination, and plan of the patient.  28 M no PMH with fever, sore throat, nasal congestion, cough x 3 days. No ill contacts. Febrile to 103.1, tachy to 118. tachy to 103 when being examined. tachycardia likely 2/2 fever. No tylenol yet today. Will give tylenol and motrin for fever. Likely viral etiology. Will send covid flu swab and reassess.  Well appearing with clear lungs heart wnl, op wnl

## 2022-12-01 NOTE — ED PROVIDER NOTE - NS ED ROS FT
Gen: +Fever  CV: Denies chest pain  Skin: denies color changes  Resp: +Cough  Endo: Denies increased urination  GI: Denies nausea, vomiting  Msk: Denies extremity pain  : Denies dysuria  Neuro: Denies LOC  Psych: Denies mood changes  all other ROS negative unless indicated in HPI

## 2022-12-01 NOTE — ED PROVIDER NOTE - PATIENT PORTAL LINK FT
You can access the FollowMyHealth Patient Portal offered by [facility] by registering at the following website: [email or web address]. By joining [facility]’s FollowMyHealth portal, you will also be able to view your health information using other applications (apps) compatible with our system.